# Patient Record
Sex: MALE | Race: WHITE | NOT HISPANIC OR LATINO | Employment: FULL TIME | URBAN - METROPOLITAN AREA
[De-identification: names, ages, dates, MRNs, and addresses within clinical notes are randomized per-mention and may not be internally consistent; named-entity substitution may affect disease eponyms.]

---

## 2017-03-07 ENCOUNTER — HOSPITAL ENCOUNTER (EMERGENCY)
Facility: HOSPITAL | Age: 27
Discharge: HOME | End: 2017-03-07
Admitting: EMERGENCY MEDICINE
Payer: COMMERCIAL

## 2017-03-07 VITALS
DIASTOLIC BLOOD PRESSURE: 78 MMHG | TEMPERATURE: 98.3 F | OXYGEN SATURATION: 96 % | SYSTOLIC BLOOD PRESSURE: 128 MMHG | RESPIRATION RATE: 18 BRPM | HEART RATE: 97 BPM

## 2017-03-07 PROCEDURE — 99282 EMERGENCY DEPT VISIT SF MDM: CPT

## 2017-08-05 ENCOUNTER — HOSPITAL ENCOUNTER (EMERGENCY)
Facility: HOSPITAL | Age: 27
Discharge: HOME | End: 2017-08-05
Attending: EMERGENCY MEDICINE | Admitting: EMERGENCY MEDICINE
Payer: COMMERCIAL

## 2017-08-05 VITALS
DIASTOLIC BLOOD PRESSURE: 84 MMHG | HEART RATE: 108 BPM | OXYGEN SATURATION: 97 % | TEMPERATURE: 98.3 F | SYSTOLIC BLOOD PRESSURE: 172 MMHG | RESPIRATION RATE: 20 BRPM

## 2017-08-05 DIAGNOSIS — F19.90 DRUG USE: Primary | ICD-10-CM

## 2017-08-05 PROCEDURE — 99283 EMERGENCY DEPT VISIT LOW MDM: CPT

## 2017-08-05 PROCEDURE — 93005 ELECTROCARDIOGRAM TRACING: CPT

## 2017-08-07 LAB
ATRIAL RATE: 96 BPM
P AXIS: 65 DEGREES
PR INTERVAL: 128 MS
QRS AXIS: 67 DEGREES
QRSD INTERVAL: 90 MS
QT INTERVAL: 328 MS
QTC INTERVAL: 406 MS
T WAVE AXIS: 29 DEGREES
VENTRICULAR RATE: 92 BPM

## 2017-08-31 ENCOUNTER — HOSPITAL ENCOUNTER (EMERGENCY)
Facility: HOSPITAL | Age: 27
Discharge: LEFT BEFORE DISCHARGE | End: 2017-08-31
Attending: EMERGENCY MEDICINE
Payer: COMMERCIAL

## 2017-08-31 ENCOUNTER — APPOINTMENT (EMERGENCY)
Dept: RADIOLOGY | Facility: HOSPITAL | Age: 27
End: 2017-08-31
Payer: COMMERCIAL

## 2017-08-31 VITALS
WEIGHT: 115.4 LBS | DIASTOLIC BLOOD PRESSURE: 78 MMHG | OXYGEN SATURATION: 98 % | HEART RATE: 92 BPM | RESPIRATION RATE: 18 BRPM | SYSTOLIC BLOOD PRESSURE: 123 MMHG | TEMPERATURE: 98 F

## 2017-08-31 DIAGNOSIS — R07.89 CHEST TIGHTNESS: Primary | ICD-10-CM

## 2017-08-31 PROCEDURE — 99284 EMERGENCY DEPT VISIT MOD MDM: CPT

## 2017-08-31 PROCEDURE — 93005 ELECTROCARDIOGRAM TRACING: CPT | Performed by: EMERGENCY MEDICINE

## 2017-08-31 RX ORDER — BUPRENORPHINE HYDROCHLORIDE AND NALOXONE HYDROCHLORIDE DIHYDRATE 8; 2 MG/1; MG/1
1 TABLET SUBLINGUAL DAILY
COMMUNITY

## 2017-09-13 ENCOUNTER — HOSPITAL ENCOUNTER (EMERGENCY)
Facility: HOSPITAL | Age: 27
Discharge: HOME | End: 2017-09-14
Attending: EMERGENCY MEDICINE
Payer: COMMERCIAL

## 2017-09-13 DIAGNOSIS — Z77.098 CHEMICAL EXPOSURE: Primary | ICD-10-CM

## 2017-09-14 ENCOUNTER — APPOINTMENT (EMERGENCY)
Dept: RADIOLOGY | Facility: HOSPITAL | Age: 27
End: 2017-09-14
Payer: COMMERCIAL

## 2017-09-14 VITALS
DIASTOLIC BLOOD PRESSURE: 65 MMHG | RESPIRATION RATE: 18 BRPM | OXYGEN SATURATION: 97 % | TEMPERATURE: 97.9 F | SYSTOLIC BLOOD PRESSURE: 110 MMHG | HEART RATE: 97 BPM | WEIGHT: 125 LBS

## 2017-09-14 LAB
ATRIAL RATE: 93 BPM
P AXIS: 49 DEGREES
PR INTERVAL: 124 MS
QRS AXIS: 59 DEGREES
QRSD INTERVAL: 86 MS
QT INTERVAL: 340 MS
QTC INTERVAL: 422 MS
T WAVE AXIS: 28 DEGREES
VENTRICULAR RATE: 93 BPM

## 2017-09-14 PROCEDURE — 99284 EMERGENCY DEPT VISIT MOD MDM: CPT

## 2017-09-14 PROCEDURE — 71020 HB CHEST X-RAY 2VW FRONTAL&LATL: CPT

## 2018-01-13 NOTE — MISCELLANEOUS
Provider Comments  Provider Comments: The patient did not show up for his appointment today  I was unable to leave a message for him to call back to reschedule  Signatures   Electronically signed by : CJ Aleman; Aug  3 2016  6:13AM EST                       (Author)    Electronically signed by : CJ Aleman;  Aug  3 2016  6:13AM EST                       (Author)    Electronically signed by : EDGAR Handy ; Aug  6 2016 10:01PM EST                       (Author)

## 2023-01-26 ENCOUNTER — OFFICE VISIT (OUTPATIENT)
Dept: INTERNAL MEDICINE CLINIC | Facility: CLINIC | Age: 33
End: 2023-01-26

## 2023-01-26 VITALS
HEART RATE: 76 BPM | DIASTOLIC BLOOD PRESSURE: 78 MMHG | TEMPERATURE: 98.4 F | WEIGHT: 149 LBS | HEIGHT: 68 IN | BODY MASS INDEX: 22.58 KG/M2 | OXYGEN SATURATION: 97 % | SYSTOLIC BLOOD PRESSURE: 112 MMHG

## 2023-01-26 DIAGNOSIS — L40.9 PSORIASIS: ICD-10-CM

## 2023-01-26 DIAGNOSIS — Z13.6 ENCOUNTER FOR SCREENING FOR CARDIOVASCULAR DISORDERS: ICD-10-CM

## 2023-01-26 DIAGNOSIS — R39.14 FEELING OF INCOMPLETE BLADDER EMPTYING: Primary | ICD-10-CM

## 2023-01-26 DIAGNOSIS — D17.22 LIPOMA OF LEFT UPPER EXTREMITY: ICD-10-CM

## 2023-01-26 LAB
SL AMB  POCT GLUCOSE, UA: NORMAL
SL AMB LEUKOCYTE ESTERASE,UA: NORMAL
SL AMB POCT BILIRUBIN,UA: NORMAL
SL AMB POCT BLOOD,UA: NORMAL
SL AMB POCT CLARITY,UA: CLEAR
SL AMB POCT COLOR,UA: YELLOW
SL AMB POCT KETONES,UA: NORMAL
SL AMB POCT NITRITE,UA: NORMAL
SL AMB POCT PH,UA: 7
SL AMB POCT SPECIFIC GRAVITY,UA: 1.02
SL AMB POCT URINE PROTEIN: NORMAL
SL AMB POCT UROBILINOGEN: 0.2

## 2023-01-26 RX ORDER — TAMSULOSIN HYDROCHLORIDE 0.4 MG/1
0.4 CAPSULE ORAL
Qty: 30 CAPSULE | Refills: 5 | Status: SHIPPED | OUTPATIENT
Start: 2023-01-26

## 2023-01-26 NOTE — PROGRESS NOTES
Name: Katy Torres      : 1990      MRN: [id number]  Encounter Provider: Jeane Johnson MD  Encounter Date: 2023   Encounter department: [address] [place] [address]  Feeling of incomplete bladder emptying  Assessment & Plan:   Urinalysis was unremarkable  Will initiate a trial of tamsulosin and re-evaluate    Orders:  -     tamsulosin (FLOMAX) 0 4 mg; Take 1 capsule (0 4 mg total) by mouth daily with dinner  -     CBC and differential; Future  -     Comprehensive metabolic panel; Future    2  Encounter for screening for cardiovascular disorders  Assessment & Plan:   Lipid profile and baseline blood work  Orders:  -     Lipid panel; Future    3  Psoriasis  -     CBC and differential; Future  -     Comprehensive metabolic panel; Future    4  Lipoma of left upper extremity  Assessment & Plan:   No specific treatment other than observation at this time  Patient will discuss with Dermatology when he is evaluated for his   Psoriasis    Orders:  -     CBC and differential; Future  -     Comprehensive metabolic panel; Future           Subjective       Patient presents to the office to establish primary care  He complains of an occasional feeling of fullness in his ears and is concerned about wax accumulations  He also notes a lipoma on his left shoulder  He has a history of psoriasis but does have a consultation scheduled with Dermatology  He is not currently taking any medications for his psoriasis  He also complains of a sensation of incomplete bladder emptying  He notes that at determination of his micturition he feels that he still has some urine that has not passed  He denies any dysuria or any type of discomfort  He denies any nocturia, hematuria, flank pain, saddle pain, discharge  He has no known cardiac history  Family history of hypertension and  Hodgkin's lymphoma in his maternal grandparents    He has a history of opioid addiction but has been clean for 4 years    Review of Systems   Constitutional: Negative  Negative for activity change, appetite change, chills, diaphoresis, fatigue, fever and unexpected weight change  HENT: Negative  Eyes: Negative  Respiratory: Negative  Cardiovascular: Negative  Gastrointestinal: Negative  Endocrine: Negative  Genitourinary: Negative  Musculoskeletal: Negative  Skin:         History of psoriasis and left shoulder lipoma   Neurological: Negative  Hematological: Negative  Psychiatric/Behavioral: The patient is not nervous/anxious          Past Medical History:   Diagnosis Date   • Addiction to drug ([address] )    • Hx of migraine headaches    • Psoriasis      Past Surgical History:   Procedure Laterality Date   • TYMPANOSTOMY TUBE PLACEMENT      age 10     Family History   Problem Relation Age of Onset   • No Known Problems Mother    • Hypertension Father    • Other Father         Colitiis   • Hodgkin's lymphoma Maternal Grandmother 72   • Lymphoma Maternal Grandfather 72        non-hodgkin's     Social History     Socioeconomic History   • Marital status: Single     Spouse name: None   • Number of children: None   • Years of education: None   • Highest education level: None   Occupational History   • None   Tobacco Use   • Smoking status: Former     Packs/day: 0 50     Types: Cigarettes     Start date:      Quit date: 2018     Years since quittin 0   • Smokeless tobacco: Never   Vaping Use   • Vaping Use: Never used   Substance and Sexual Activity   • Alcohol use: No   • Drug use: No   • Sexual activity: None   Other Topics Concern   • None   Social History Narrative   • None     Social Determinants of Health     Financial Resource Strain: Not on file   Food Insecurity: Not on file   Transportation Needs: Not on file   Physical Activity: Not on file   Stress: Not on file   Social Connections: Not on file   Intimate Partner Violence: Not on file   Housing Stability: Not on file     Current Outpatient Medications on File Prior to Visit   Medication Sig   • [DISCONTINUED] buprenorphine-naloxone (SUBOXONE) 8-2 mg per SL tablet Place 1 tablet under the tongue daily     Allergies   Allergen Reactions   • Augmentin [Amoxicillin-Pot Clavulanate] Hives   • Cefaclor Hives   • Cephalosporins Hives     Immunization History   Administered Date(s) Administered   • COVID-19 J&J (Favian) vaccine 0 5 mL 04/08/2021, 12/02/2021   • Tdap 04/13/2011       Objective     /78 (BP Location: Left arm, Patient Position: Sitting, Cuff Size: Standard)   Pulse 76   Temp 98 4 °F (36 9 °C) (Tympanic)   Ht 5' 7 72" (1 72 m)   Wt 67 6 kg (149 lb)   SpO2 97%   BMI 22 85 kg/m²     Physical Exam  Vitals reviewed  Constitutional:       General: He is not in acute distress  Appearance: Normal appearance  He is normal weight  He is not ill-appearing, toxic-appearing or diaphoretic  HENT:      Head: Normocephalic and atraumatic  Right Ear: Tympanic membrane, ear canal and external ear normal  There is no impacted cerumen  Left Ear: Tympanic membrane, ear canal and external ear normal  There is no impacted cerumen  Nose: Nose normal    Eyes:      General: No scleral icterus  Conjunctiva/sclera: Conjunctivae normal       Pupils: Pupils are equal, round, and reactive to light  Neck:      Vascular: No carotid bruit or JVD  Trachea: No tracheal deviation  Cardiovascular:      Rate and Rhythm: Normal rate and regular rhythm  Pulses: Normal pulses  Heart sounds: Normal heart sounds  No murmur heard  Pulmonary:      Effort: Pulmonary effort is normal  No respiratory distress  Breath sounds: Normal breath sounds  No rales  Abdominal:      General: Abdomen is flat  There is no distension  Musculoskeletal:         General: Deformity ( small lipoma approximately 3 cm diameter overlying the humeral head of the left shoulder) present  No swelling        Cervical back: Neck supple  Right lower leg: No edema  Left lower leg: No edema  Skin:     General: Skin is warm  Coloration: Skin is not jaundiced  Findings: Rash ( patchy areas of psoriasis  ) present  No bruising or erythema  Neurological:      General: No focal deficit present  Mental Status: He is alert and oriented to person, place, and time  Mental status is at baseline     Psychiatric:         Mood and Affect: Mood normal          Behavior: Behavior normal        Ruth Ann Patel MD

## 2023-01-26 NOTE — ASSESSMENT & PLAN NOTE
No specific treatment other than observation at this time    Patient will discuss with Dermatology when he is evaluated for his   Psoriasis

## 2023-03-27 PROBLEM — Z13.6 ENCOUNTER FOR SCREENING FOR CARDIOVASCULAR DISORDERS: Status: RESOLVED | Noted: 2023-01-26 | Resolved: 2023-03-27

## 2023-07-27 ENCOUNTER — APPOINTMENT (OUTPATIENT)
Dept: RADIOLOGY | Facility: OTHER | Age: 33
End: 2023-07-27
Payer: COMMERCIAL

## 2023-07-27 VITALS
SYSTOLIC BLOOD PRESSURE: 119 MMHG | WEIGHT: 149 LBS | DIASTOLIC BLOOD PRESSURE: 75 MMHG | HEIGHT: 67 IN | BODY MASS INDEX: 23.39 KG/M2 | HEART RATE: 75 BPM

## 2023-07-27 DIAGNOSIS — M25.522 PAIN IN LEFT ELBOW: Primary | ICD-10-CM

## 2023-07-27 DIAGNOSIS — M25.522 PAIN IN LEFT ELBOW: ICD-10-CM

## 2023-07-27 DIAGNOSIS — M79.2 RADICULAR PAIN IN LEFT ARM: ICD-10-CM

## 2023-07-27 DIAGNOSIS — G89.29 CHRONIC NECK PAIN: ICD-10-CM

## 2023-07-27 DIAGNOSIS — M54.2 CHRONIC NECK PAIN: ICD-10-CM

## 2023-07-27 PROCEDURE — 99203 OFFICE O/P NEW LOW 30 MIN: CPT | Performed by: PHYSICIAN ASSISTANT

## 2023-07-27 PROCEDURE — 73080 X-RAY EXAM OF ELBOW: CPT

## 2023-07-27 NOTE — PATIENT INSTRUCTIONS
Cervical Disc Herniation   WHAT YOU NEED TO KNOW:   Cervical disc herniation occurs when a cervical disc bulges out. Cervical discs are natural, spongy cushions between the vertebrae (bones) in your neck. The bulging disc may press on your nerves or spinal cord. DISCHARGE INSTRUCTIONS:   Return to the emergency department if:   You suddenly have trouble breathing. You lose feeling in one or both of your arms. You are suddenly not able to move your neck, or one or both of your arms. You are not able to move one or both of your legs. Contact your healthcare provider if:   Your pain gets worse, even after you take medicine. Your voice suddenly becomes hoarse. You have trouble swallowing. You have questions or concerns about your condition or care. Medicines: You may need any of the following:  NSAIDs , such as ibuprofen, help decrease swelling and pain. NSAIDs can cause stomach bleeding or kidney problems in certain people. If you take blood thinner medicine, always ask your healthcare provider if NSAIDs are safe for you. Always read the medicine label and follow directions. Prescription pain medicine  may be given. Ask how to take this medicine safely. Muscle relaxers  decrease pain and muscle spasms. Take your medicine as directed. Contact your healthcare provider if you think your medicine is not helping or if you have side effects. Tell your provider if you are allergic to any medicine. Keep a list of the medicines, vitamins, and herbs you take. Include the amounts, and when and why you take them. Bring the list or the pill bottles to follow-up visits. Carry your medicine list with you in case of an emergency. Activity:  Your healthcare provider may have you rest in bed to prevent further injury to your neck. Ask how long you should rest and when you can return to your daily activities.   Heat:  Apply heat on your neck for 20 to 30 minutes every 2 hours for as many days as directed. Heat helps decrease pain and muscle spasms. Ice:  Apply ice on your neck for 15 to 20 minutes every hour or as directed. Use an ice pack, or put crushed ice in a plastic bag. Cover it with a towel before you apply it to your skin. Ice helps prevent tissue damage and decreases swelling and pain. Physical therapy:  A physical therapist teaches you exercises to make your neck muscles stronger. A physical therapist also teaches you stretches to decrease your pain. Follow up with your healthcare provider as directed:  Write down your questions so you remember to ask them during your visits. © Copyright [employer] 2022 Information is for End User's use only and may not be sold, redistributed or otherwise used for commercial purposes. The above information is an  only. It is not intended as medical advice for individual conditions or treatments. Talk to your doctor, nurse or pharmacist before following any medical regimen to see if it is safe and effective for you.

## 2023-08-03 ENCOUNTER — EVALUATION (OUTPATIENT)
Dept: PHYSICAL THERAPY | Facility: REHABILITATION | Age: 33
End: 2023-08-03
Payer: COMMERCIAL

## 2023-08-03 DIAGNOSIS — M79.2 RADICULAR PAIN IN LEFT ARM: ICD-10-CM

## 2023-08-03 DIAGNOSIS — G89.29 CHRONIC NECK PAIN: Primary | ICD-10-CM

## 2023-08-03 DIAGNOSIS — M54.2 CHRONIC NECK PAIN: Primary | ICD-10-CM

## 2023-08-03 DIAGNOSIS — M25.522 PAIN IN LEFT ELBOW: ICD-10-CM

## 2023-08-03 PROCEDURE — 97140 MANUAL THERAPY 1/> REGIONS: CPT

## 2023-08-03 PROCEDURE — 97110 THERAPEUTIC EXERCISES: CPT

## 2023-08-03 PROCEDURE — 97161 PT EVAL LOW COMPLEX 20 MIN: CPT

## 2023-08-03 NOTE — PROGRESS NOTES
PT Evaluation     Today's date: 8/3/2023  Patient name: Bard Littlejohn  : 1990  MRN: [id number]  Referring provider: DANIELA Silva*  Dx:   Encounter Diagnosis     ICD-10-CM    1. Chronic neck pain  M54.2 Ambulatory Referral to Physical Therapy    G89.29       2. Pain in left elbow  M25.522 Ambulatory Referral to Physical Therapy      3. Radicular pain in left arm  M79.2 Ambulatory Referral to Physical Therapy          Start Time: 1630  Stop Time: 9339  Total time in clinic (min): 45 minutes    Assessment  Assessment details: Bard Littlejohn is a 28 y.o. male presenting with cervical and thoracic pain indicative of muscular tightness and motor control deficit. Pt has not had radicular symptoms in about 5 days. Likely not cervical radiculopathy at this time. Primary impairments include trapezius and levator scapulae pain with functional activities, middle and lower trapezius weakness, cervical AROM dysfunction, scapular and deep neck flexor motor control dysfunction. Educated pt on anatomy and physiology of diagnosis. Will benefit from skilled PT interventions for community reintegration, ADL management/independence, return to work/sport/hobbies. Provided pt with written home exercise program to be completed daily. Impairments: abnormal coordination, abnormal muscle firing, abnormal muscle tone, abnormal or restricted ROM, activity intolerance, impaired physical strength, lacks appropriate home exercise program, pain with function, poor posture  and poor body mechanics  Functional limitations: cervical flexion and R rotation  Symptom irritability: low  Goals    Short Term Goals: In 4 weeks, the patient will:  1. Improve L middle trapezius strength to at least 4+/5 MMT  2. Improve L lower trapezius strength to at least 4+/5 MMT  3. Supervision with [facility] for self-care    Long Term Goals: In 8 weeks, the patient will:  1.  Tolerate playing full round of golf without aggravating pain symptomology  2. FOTO to greater than predicted value  3.  Independent with HE[facility] for self-care    Plan  Patient would benefit from: skilled physical therapy  Planned modality interventions: manual electrical stimulation  Planned therapy interventions: abdominal trunk stabilization, activity modification, balance, balance/weight bearing training, behavior modification, body mechanics training, community reintegration, coordination, fine motor coordination training, flexibility, functional ROM exercises, gait training, graded activity, graded exercise, graded motor, home exercise program, work reintegration, therapeutic training, therapeutic exercise, therapeutic activities, stretching, strengthening, self care, postural training, patient education, neuromuscular re-education, motor coordination training, massage, manual therapy, joint mobilization and ADL training  Frequency: 1x week  Duration in weeks: 8  Plan of Care beginning date: 8/3/2023  Plan of Care expiration date: 9/28/2023  Treatment plan discussed with: patient        Subjective    Pain Location: L sided cervical and upper thoracic pain radiating into LUE to medial elbow  Pain Intensity: "push" "pull", occasional sharp; worst 8/10  MARGARET: tightness after sleeping on new pillow then played a round of golf, has been improving slightly recently  DOI: 2 months  Aggravating Factors: cervical flexion+R rotation  Alleviating Factors: Aleve 2x per day  Living Situation: independent ADLs  Constitutional S/S: initial paresthesias but has not had these symptoms for 4-5 days   Dominant Hand: R for writing; L for everything else  Goals: "to minimize pain"  PLOF: has not swung golf club since injury      Objective       Postural Findings:              Head Position x Protracted  Neutral  Retracted   Scapular Position x Protracted  Neutral  Retracted   Thoracic Spine  Inc Kyphosis x Neutral     Lumbar Spine  Inc Lordosis x Neutral  Dec Lordosis   Pelvis  Anterior Tilt x Neutral  Posterior Tilt   Iliac Crest  L elevated x Neutral  R elevated   Scoliotic Curvature  "C" Curve  "S" Curve     Lateral Shift  Right  Left x None       Strength and ROM evaluated B from a regional biomechanical perspective and values relevant to this episode recorded in tables below    Range of Motion measurements for the Cervical Spine (in degrees)     Joint Motion Right:  Left:    Flexion 75%*    Extension 75%    Rotation 50%* 75%   Lateral Flexion 50% 75%   Protraction 75%    Retraction 15%    * indicates increase in pain    UE Myotomes:  Nerve Root Test Action RIGHT  LEFT    C3 Neck side flexion intact intact   C4 Shoulder elevation intact intact   C5 Shoulder abduction intact intact   C6 Elbow Flexion and wrist extension intact intact   C7 Elbow extension and wrist flexion intact intact   C8 Thumb extension and ulnar deviation intact intact   T1 Hand intrinsics intact intact       Manual Muscle Testing:     Strength: MMT revealed the following findings.   Joint Motion Right Left   Upper trap 5/5 5/5   Mid trap 5/5 4-/5*   Low trap 5/5 4-/5*   Rhomboids 5/5 5/5   Latissimus dorsi 5/5               5/5   * indicates increase in pain        Palpation:  TTP throughout L trapezius (upper, middle), levator scap, subocipitals    Joint Mobility: WFL    Cervical Vascular Screenin-D's   Dizziness   Diploplia   Drop Attacks   Dysphagia   Dysarthria  3-N's   Nausea   Nystagmus    Numbness  The above were all  Negative    Upper Cervical Ligament Testing:   Transverse ligament stress test   Alar Ligament stress test   Sharp-Eliana   *The above were all  Negative      OUTCOME MEAUSURES:     FOTO: 79                 Precautions: lipoma removal     Asterisk signs and Outcome Measures:                                                                                                                                                                                 Test / Measure  8/3/2023      FOTO 79      C/s retraction AROM 15%      L mid, low trap MMT 4-/5          Manuals 8/3            C/s STM, SOR, UT S, PROM MM 8'                                                   Neuro Re-Ed             DNF supine 5x            Neck bridge at wall             Prone I, T, Y                                                                 Ther Ex             HEP review 5'            UBE             Rows 10x mtb            Sh ext 10x mtb            Facepulls             UT/LS S 30" ea            Shrugs                          Ther Activity                                       Gait Training                                       Modalities

## 2023-08-08 ENCOUNTER — OFFICE VISIT (OUTPATIENT)
Dept: PHYSICAL THERAPY | Facility: REHABILITATION | Age: 33
End: 2023-08-08
Payer: COMMERCIAL

## 2023-08-08 DIAGNOSIS — M79.2 RADICULAR PAIN IN LEFT ARM: ICD-10-CM

## 2023-08-08 DIAGNOSIS — M54.2 CHRONIC NECK PAIN: ICD-10-CM

## 2023-08-08 DIAGNOSIS — G89.29 CHRONIC NECK PAIN: ICD-10-CM

## 2023-08-08 DIAGNOSIS — M25.522 PAIN IN LEFT ELBOW: Primary | ICD-10-CM

## 2023-08-08 PROCEDURE — 97140 MANUAL THERAPY 1/> REGIONS: CPT

## 2023-08-08 PROCEDURE — 97110 THERAPEUTIC EXERCISES: CPT

## 2023-08-08 PROCEDURE — 97112 NEUROMUSCULAR REEDUCATION: CPT

## 2023-08-08 NOTE — PROGRESS NOTES
Daily Note     Today's date: 2023  Patient name: Sulema Valenzuela  : 1990  MRN: [id number]  Referring provider: DANIELA Caputo*  Dx:   Encounter Diagnosis     ICD-10-CM    1. Pain in left elbow  M25.522       2. Chronic neck pain  M54.2     G89.29       3. Radicular pain in left arm  M79.2           Start Time: 81  Stop Time: 6309  Total time in clinic (min): 40 minutes    Subjective: Pt notes that his neck pain has improved and he was able to hit a few golf balls without pain today. Objective: See treatment diary below      Assessment: Tolerated treatment well. Patient would benefit from continued PT. Pt was introduced to further functional UE strength and cervico-thoracic postural endurance exercises and responded well without increase in pain. He showed increased soreness with palpation along thoracic spine paraspinals and responded well to MT focused on paraspinal STM and thoracic extension. He was most challenged with shrugs + retraction and standing rotation as paraspinal strength and thoracic mobility continues to be decreased. He responds well to VCs for elbow positioning and scapular stabilization throughout UE movements and shows improved technique with cueing. 1:1 with Shakira Dykes DPT entirety of tx. Plan: Continue per plan of care.       Precautions: lipoma removal     Asterisk signs and Outcome Measures:                                                                                                                                                                                 Test / Measure  8/3/2023      FOTO 79      C/s retraction AROM 15%      L mid, low trap MMT 4-/5          Manuals 8/3 8/8           C/s STM, SOR, UT S, PROM MM 8' [facility] 8'                                                                      Neuro Re-Ed             DNF supine 5x 10x           Neck bridge at wall             Prone I, T, Y  I/T 10x 3s hold           Supine Thoracic Extension  2x10, Blue foam                                                  Ther Ex             HEP review 5'            UBE  3'/3', lvl 7           SL Open Book BL  8x ea, 5s hold           Rows 10x mtb 3x8, 35# susy           Sh ext 10x mtb 3x8, 20# susy           Facepulls  3x8, 25#           UT/LS S 30" ea            Shrugs  +retraction, 3x10, 15#           Long Bar Thoracic Rotation  13#, 2x10 BL                                     Ther Activity                                       Gait Training                                       Modalities

## 2023-08-15 ENCOUNTER — APPOINTMENT (OUTPATIENT)
Dept: PHYSICAL THERAPY | Facility: REHABILITATION | Age: 33
End: 2023-08-15
Payer: COMMERCIAL

## 2023-08-17 ENCOUNTER — APPOINTMENT (OUTPATIENT)
Dept: PHYSICAL THERAPY | Facility: REHABILITATION | Age: 33
End: 2023-08-17
Payer: COMMERCIAL

## 2023-08-22 ENCOUNTER — APPOINTMENT (OUTPATIENT)
Dept: PHYSICAL THERAPY | Facility: REHABILITATION | Age: 33
End: 2023-08-22
Payer: COMMERCIAL

## 2023-08-29 ENCOUNTER — APPOINTMENT (OUTPATIENT)
Dept: PHYSICAL THERAPY | Facility: REHABILITATION | Age: 33
End: 2023-08-29
Payer: COMMERCIAL

## 2024-01-01 ENCOUNTER — APPOINTMENT (EMERGENCY)
Dept: CT IMAGING | Facility: HOSPITAL | Age: 34
End: 2024-01-01
Payer: COMMERCIAL

## 2024-01-01 ENCOUNTER — HOSPITAL ENCOUNTER (EMERGENCY)
Facility: HOSPITAL | Age: 34
Discharge: HOME | End: 2024-01-01
Attending: EMERGENCY MEDICINE
Payer: COMMERCIAL

## 2024-01-01 VITALS
OXYGEN SATURATION: 98 % | WEIGHT: 156.53 LBS | HEART RATE: 53 BPM | SYSTOLIC BLOOD PRESSURE: 117 MMHG | DIASTOLIC BLOOD PRESSURE: 73 MMHG | TEMPERATURE: 97.7 F | BODY MASS INDEX: 24.52 KG/M2 | RESPIRATION RATE: 17 BRPM

## 2024-01-01 DIAGNOSIS — K92.1 HEMATOCHEZIA: ICD-10-CM

## 2024-01-01 DIAGNOSIS — N50.819 TESTICULAR PAIN: Primary | ICD-10-CM

## 2024-01-01 DIAGNOSIS — R39.198 DIFFICULTY URINATING: ICD-10-CM

## 2024-01-01 LAB
ALBUMIN SERPL BCP-MCNC: 4.5 G/DL (ref 3.5–5)
ALP SERPL-CCNC: 65 U/L (ref 34–104)
ALT SERPL W P-5'-P-CCNC: 12 U/L (ref 7–52)
ANION GAP SERPL CALCULATED.3IONS-SCNC: 8 MMOL/L
AST SERPL W P-5'-P-CCNC: 16 U/L (ref 13–39)
BASOPHILS # BLD AUTO: 0.04 THOUSANDS/ÂΜL (ref 0–0.1)
BASOPHILS NFR BLD AUTO: 1 % (ref 0–1)
BILIRUB SERPL-MCNC: 0.36 MG/DL (ref 0.2–1)
BILIRUB UR QL STRIP: NEGATIVE
BUN SERPL-MCNC: 18 MG/DL (ref 5–25)
CALCIUM SERPL-MCNC: 9.2 MG/DL (ref 8.4–10.2)
CHLORIDE SERPL-SCNC: 104 MMOL/L (ref 96–108)
CK SERPL-CCNC: 69 U/L (ref 39–308)
CLARITY UR: CLEAR
CO2 SERPL-SCNC: 26 MMOL/L (ref 21–32)
COLOR UR: COLORLESS
CREAT SERPL-MCNC: 1.64 MG/DL (ref 0.6–1.3)
EOSINOPHIL # BLD AUTO: 0.08 THOUSAND/ÂΜL (ref 0–0.61)
EOSINOPHIL NFR BLD AUTO: 1 % (ref 0–6)
ERYTHROCYTE [DISTWIDTH] IN BLOOD BY AUTOMATED COUNT: 11.6 % (ref 11.6–15.1)
GFR SERPL CREATININE-BSD FRML MDRD: 53 ML/MIN/1.73SQ M
GLUCOSE SERPL-MCNC: 95 MG/DL (ref 65–140)
GLUCOSE UR STRIP-MCNC: NEGATIVE MG/DL
HCT VFR BLD AUTO: 46.3 % (ref 36.5–49.3)
HGB BLD-MCNC: 15.6 G/DL (ref 12–17)
HGB UR QL STRIP.AUTO: NEGATIVE
IMM GRANULOCYTES # BLD AUTO: 0.02 THOUSAND/UL (ref 0–0.2)
IMM GRANULOCYTES NFR BLD AUTO: 0 % (ref 0–2)
KETONES UR STRIP-MCNC: NEGATIVE MG/DL
LEUKOCYTE ESTERASE UR QL STRIP: NEGATIVE
LYMPHOCYTES # BLD AUTO: 1.96 THOUSANDS/ÂΜL (ref 0.6–4.47)
LYMPHOCYTES NFR BLD AUTO: 32 % (ref 14–44)
MCH RBC QN AUTO: 32.4 PG (ref 26.8–34.3)
MCHC RBC AUTO-ENTMCNC: 33.7 G/DL (ref 31.4–37.4)
MCV RBC AUTO: 96 FL (ref 82–98)
MONOCYTES # BLD AUTO: 0.52 THOUSAND/ÂΜL (ref 0.17–1.22)
MONOCYTES NFR BLD AUTO: 9 % (ref 4–12)
NEUTROPHILS # BLD AUTO: 3.53 THOUSANDS/ÂΜL (ref 1.85–7.62)
NEUTS SEG NFR BLD AUTO: 57 % (ref 43–75)
NITRITE UR QL STRIP: NEGATIVE
NRBC BLD AUTO-RTO: 0 /100 WBCS
PH UR STRIP.AUTO: 6 [PH]
PLATELET # BLD AUTO: 194 THOUSANDS/UL (ref 149–390)
PMV BLD AUTO: 9.5 FL (ref 8.9–12.7)
POTASSIUM SERPL-SCNC: 4 MMOL/L (ref 3.5–5.3)
PROT SERPL-MCNC: 7 G/DL (ref 6.4–8.4)
PROT UR STRIP-MCNC: NEGATIVE MG/DL
RBC # BLD AUTO: 4.82 MILLION/UL (ref 3.88–5.62)
SODIUM SERPL-SCNC: 138 MMOL/L (ref 135–147)
SP GR UR STRIP.AUTO: 1.01 (ref 1–1.03)
UROBILINOGEN UR STRIP-ACNC: <2 MG/DL
WBC # BLD AUTO: 6.15 THOUSAND/UL (ref 4.31–10.16)

## 2024-01-01 PROCEDURE — 85025 COMPLETE CBC W/AUTO DIFF WBC: CPT

## 2024-01-01 PROCEDURE — 80053 COMPREHEN METABOLIC PANEL: CPT

## 2024-01-01 PROCEDURE — 82550 ASSAY OF CK (CPK): CPT | Performed by: EMERGENCY MEDICINE

## 2024-01-01 PROCEDURE — 96361 HYDRATE IV INFUSION ADD-ON: CPT

## 2024-01-01 PROCEDURE — 99283 EMERGENCY DEPT VISIT LOW MDM: CPT

## 2024-01-01 PROCEDURE — G1004 CDSM NDSC: HCPCS

## 2024-01-01 PROCEDURE — 74177 CT ABD & PELVIS W/CONTRAST: CPT

## 2024-01-01 PROCEDURE — 36415 COLL VENOUS BLD VENIPUNCTURE: CPT

## 2024-01-01 PROCEDURE — 87491 CHLMYD TRACH DNA AMP PROBE: CPT | Performed by: EMERGENCY MEDICINE

## 2024-01-01 PROCEDURE — 99285 EMERGENCY DEPT VISIT HI MDM: CPT | Performed by: EMERGENCY MEDICINE

## 2024-01-01 PROCEDURE — 81003 URINALYSIS AUTO W/O SCOPE: CPT | Performed by: EMERGENCY MEDICINE

## 2024-01-01 PROCEDURE — 87591 N.GONORRHOEAE DNA AMP PROB: CPT | Performed by: EMERGENCY MEDICINE

## 2024-01-01 PROCEDURE — 96360 HYDRATION IV INFUSION INIT: CPT

## 2024-01-01 RX ORDER — DOCUSATE SODIUM 100 MG/1
100 CAPSULE, LIQUID FILLED ORAL EVERY 12 HOURS
Qty: 60 CAPSULE | Refills: 0 | Status: SHIPPED | OUTPATIENT
Start: 2024-01-01

## 2024-01-01 RX ADMIN — IOHEXOL 85 ML: 350 INJECTION, SOLUTION INTRAVENOUS at 21:01

## 2024-01-01 RX ADMIN — SODIUM CHLORIDE 1000 ML: 0.9 INJECTION, SOLUTION INTRAVENOUS at 21:38

## 2024-01-01 NOTE — Clinical Note
Mitesh Ulloa was seen and treated in our emergency department on 1/1/2024.                Diagnosis: Lower Abdominal Pain    Mitesh  .    He may return on this date: 01/03/2024         If you have any questions or concerns, please don't hesitate to call.      Casper Hammer, DO    ______________________________           _______________          _______________  Hospital Representative                              Date                                Time

## 2024-01-02 LAB
C TRACH DNA SPEC QL NAA+PROBE: NEGATIVE
N GONORRHOEA DNA SPEC QL NAA+PROBE: NEGATIVE

## 2024-01-02 NOTE — ED PROVIDER NOTES
History  Chief Complaint   Patient presents with    Groin Pain     Patient here for eval of groin pain that worsened last night. +blood in stools. States he's been going through this for the past 2 years. Last BM today. +Nausea.      23-year-old male with history of substance abuse disorder, chronic difficulty in emptying his bladder, presents today with left testicular pain and blood in stool.  Patient noted this morning his left testicle was painful and tender to palpation, never had a bowel movement and noticed darker red blood in the toilet.  Patient notes continued discomfort in the testicle and rectum, but pain has subsided.  Denies any change in his urinary frequency, odor, diarrhea/constipation, fever/chills, or any other symptoms at this time.        Prior to Admission Medications   Prescriptions Last Dose Informant Patient Reported? Taking?   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    E-Cigarette/Vaping    E-Cigarette Use Never User      E-Cigarette/Vaping Substances     Social History     Tobacco Use    Smoking status: Former     Current packs/day: 0.00     Average packs/day: 0.5 packs/day for 11.0 years (5.5 ttl pk-yrs)     Types: Cigarettes     Start date:      Quit date: 2018     Years since quittin.0    Smokeless tobacco: Never   Vaping  Use    Vaping status: Never Used   Substance Use Topics    Alcohol use: No    Drug use: Not Currently        Review of Systems   Constitutional:  Negative for chills and fever.   HENT:  Negative for hearing loss and rhinorrhea.    Eyes:  Negative for visual disturbance.   Respiratory:  Negative for cough and shortness of breath.    Cardiovascular:  Negative for chest pain.   Gastrointestinal:  Positive for abdominal pain (suprapubic) and blood in stool. Negative for constipation, diarrhea, nausea and vomiting.   Genitourinary:  Positive for difficulty urinating, frequency and testicular pain. Negative for dysuria, hematuria and scrotal swelling.   Musculoskeletal:  Negative for back pain.   Skin:  Negative for color change and rash.   Neurological:  Negative for weakness and numbness.   Psychiatric/Behavioral:  Negative for agitation.    All other systems reviewed and are negative.      Physical Exam  ED Triage Vitals   Temperature Pulse Respirations Blood Pressure SpO2   01/01/24 1727 01/01/24 1727 01/01/24 1727 01/01/24 1727 01/01/24 1727   97.7 °F (36.5 °C) 61 18 137/67 99 %      Temp Source Heart Rate Source Patient Position - Orthostatic VS BP Location FiO2 (%)   01/01/24 1727 01/01/24 1727 01/01/24 1727 01/01/24 1727 --   Oral Monitor Sitting Right arm       Pain Score       01/01/24 2005       7             Orthostatic Vital Signs  Vitals:    01/01/24 1727 01/01/24 2005 01/01/24 2217   BP: 137/67 116/70 117/73   Pulse: 61 (!) 54 (!) 53   Patient Position - Orthostatic VS: Sitting Sitting Lying       Physical Exam  Vitals and nursing note reviewed.   Constitutional:       General: He is not in acute distress.     Appearance: He is not ill-appearing.   HENT:      Head: Normocephalic and atraumatic.      Right Ear: External ear normal.      Left Ear: External ear normal.      Nose: Nose normal. No congestion or rhinorrhea.      Mouth/Throat:      Mouth: Mucous membranes are moist.      Pharynx: Oropharynx is  clear.   Eyes:      General: No scleral icterus.     Extraocular Movements: Extraocular movements intact.   Cardiovascular:      Rate and Rhythm: Normal rate and regular rhythm.      Pulses: Normal pulses.      Heart sounds: Normal heart sounds.   Pulmonary:      Effort: Pulmonary effort is normal.      Breath sounds: Normal breath sounds.   Abdominal:      Palpations: Abdomen is soft.      Tenderness: There is abdominal tenderness (Suprapubic).   Genitourinary:     Penis: Normal.       Testes: Normal.      Comments: No abnormal scrotal skin changes, testicles nontender to palpation or manipulation, cremasteric reflex intact.  Musculoskeletal:         General: Normal range of motion.      Cervical back: Normal range of motion.   Skin:     General: Skin is warm and dry.   Neurological:      General: No focal deficit present.      Mental Status: He is alert and oriented to person, place, and time.   Psychiatric:         Mood and Affect: Mood normal.         Behavior: Behavior normal.         ED Medications  Medications   iohexol (OMNIPAQUE) 350 MG/ML injection (MULTI-DOSE) 85 mL (85 mL Intravenous Given 1/1/24 2101)   sodium chloride 0.9 % bolus 1,000 mL (1,000 mL Intravenous New Bag 1/1/24 2138)       Diagnostic Studies  Results Reviewed       Procedure Component Value Units Date/Time    UA (URINE) with reflex to Scope [[id number]] Collected: 01/01/24 2139    Lab Status: Final result Specimen: Urine, Clean Catch Updated: 01/01/24 2153     Color, UA Colorless     Clarity, UA Clear     Specific Gravity, UA 1.007     pH, UA 6.0     Leukocytes, UA Negative     Nitrite, UA Negative     Protein, UA Negative mg/dl      Glucose, UA Negative mg/dl      Ketones, UA Negative mg/dl      Urobilinogen, UA <2.0 mg/dl      Bilirubin, UA Negative     Occult Blood, UA Negative    Chlamydia/GC amplified DNA by PCR [[id number]] Collected: 01/01/24 2139    Lab Status: In process Specimen: Urine, Other Updated: 01/01/24 2143    CK [[id number]]   (Normal) Collected: 01/01/24 1910    Lab Status: Final result Specimen: Blood from Arm, Left Updated: 01/01/24 2139     Total CK 69 U/L     Comprehensive metabolic panel [[id number]]  (Abnormal) Collected: 01/01/24 1910    Lab Status: Final result Specimen: Blood from Arm, Left Updated: 01/01/24 2041     Sodium 138 mmol/L      Potassium 4.0 mmol/L      Chloride 104 mmol/L      CO2 26 mmol/L      ANION GAP 8 mmol/L      BUN 18 mg/dL      Creatinine 1.64 mg/dL      Glucose 95 mg/dL      Calcium 9.2 mg/dL      AST 16 U/L      ALT 12 U/L      Alkaline Phosphatase 65 U/L      Total Protein 7.0 g/dL      Albumin 4.5 g/dL      Total Bilirubin 0.36 mg/dL      eGFR 53 ml/min/1.73sq m     Narrative:      National Kid[employer] Foundation guidelines for Chronic Kidney Disease (CKD):     Stage 1 with normal or high GFR (GFR > 90 mL/min/1.73 square meters)    Stage 2 Mild CKD (GFR = 60-89 mL/min/1.73 square meters)    Stage 3A Moderate CKD (GFR = 45-59 mL/min/1.73 square meters)    Stage 3B Moderate CKD (GFR = 30-44 mL/min/1.73 square meters)    Stage 4 Severe CKD (GFR = 15-29 mL/min/1.73 square meters)    Stage 5 End Stage CKD (GFR <15 mL/min/1.73 square meters)  Note: GFR calculation is accurate only with a steady state creatinine    CBC and differential [[id number]] Collected: 01/01/24 1910    Lab Status: Final result Specimen: Blood from Arm, Left Updated: 01/01/24 1919     WBC 6.15 Thousand/uL      RBC 4.82 Million/uL      Hemoglobin 15.6 g/dL      Hematocrit 46.3 %      MCV 96 fL      MCH 32.4 pg      MCHC 33.7 g/dL      RDW 11.6 %      MPV 9.5 fL      Platelets 194 Thousands/uL      nRBC 0 /100 WBCs      Neutrophils Relative 57 %      Immat GRANS % 0 %      Lymphocytes Relative 32 %      Monocytes Relative 9 %      Eosinophils Relative 1 %      Basophils Relative 1 %      Neutrophils Absolute 3.53 Thousands/µL      Immature Grans Absolute 0.02 Thousand/uL      Lymphocytes Absolute 1.96 Thousands/µL      Monocytes Absolute  0.52 Thousand/µL      Eosinophils Absolute 0.08 Thousand/µL      Basophils Absolute 0.04 Thousands/µL                    CT abdomen pelvis with contrast   Final Result by David Jimenez MD (01/01 2246)      No acute intra-abdominal abnormality. No free air or free fluid.            Workstation performed: [id number]               Procedures  Procedures      ED Course  ED Course as of 01/01/24 2305   Mon Jan 01, 2024 2022 CBC and differential  wnl                                       Medical Decision Making  Mitesh came to the ER due to continued difficulty urinating, and acute onsets of testicular pain and hematochezia.  Patient noted to testicular pain this morning and then had a large bowel movement with red blood in the toilet.  He has never had any symptoms before.  Exam was largely unremarkable with no signs of external scrotal changes, and testicles not being tender to palpation/manipulation.  Patient has been seen for multiple years and was attempting it worked up for his urinary difficulties while he was in [facility].  Lab work was largely unremarkable aside from an elevated creatinine.  We did not have any labs to compare to and patient was unable to recall if this is chronic or acute.  Patient given a liter of normal saline.  CT of abdomen pelvis showed no acute intra-abdominal abnormalities.  Patient was comfortable throughout his stay in the ED.  Due to urinary difficulty and testicular pain, patient was given ambulatory referral for urology.  Due to hematochezia patient was given amatory referral to GI.  He was also instructed to follow with his PCP.  Patient endorsed an appointment already set for approximately 2 to 3 weeks from now with outpatient labs ordered.  He was instructed to follow through with those appointments and lab draws.  Patient understood and agreed the plan.  Patient discharged stable condition.    Amount and/or Complexity of Data Reviewed  Labs: ordered. Decision-making details  documented in ED Course.  Radiology: ordered.    Risk  OTC drugs.  Prescription drug management.          Disposition  Final diagnoses:   Testicular pain   Hematochezia   Difficulty urinating     Time reflects when diagnosis was documented in both MDM as applicable and the Disposition within this note       Time User Action Codes Description Comment    1/1/2024 10:50 PM Harman Casper TYSON Add [N50.819] Testicular pain     1/1/2024 10:50 PM Casper Hammer Add [K92.1] Hematochezia     1/1/2024 10:51 PM Casper Hammer Add [R39.198] Difficulty urinating           ED Disposition       ED Disposition   Discharge    Condition   Stable    Date/Time   Mon Jan 1, 2024 10:50 PM    Comment   Mitesh MESA Ulloa discharge to home/self care.                   Follow-up Information       Follow up With Specialties Details Why Contact Info Additional Information    [facility] [place] [facility] Call  As needed [address]  [facility] [address]  [phone] [facility] [address] [address], [address], [place], [address]   [phone]    [facility] Call  As needed [address]  [address]  [facility] [address]  [phone] [facility], [address], [place] [address]   [phone]    [facility] [place] [facility] Call  As needed [address]  [facility] [address]  [phone] [facility] [place], [address], [place], [place], [address]   [phone]            Patient's Medications   Discharge Prescriptions    DOCUSATE SODIUM (COLACE) 100 MG CAPSULE    Take 1 capsule (100 mg total) by mouth every 12 (twelve) hours       Start Date: 1/1/2024  End Date: --       Order Dose: 100 mg       Quantity: 60 capsule    Refills: 0         PDMP Review       None              ED Provider  Attending physically available and evaluated Mitesh Ulloa. I managed the patient along with the ED Attending.    Electronically Signed by           Casper Hammer DO  01/01/24 2506

## 2024-01-03 NOTE — ED ATTENDING ATTESTATION
1/1/2024  I, Michelle Bosch MD, saw and evaluated the patient. I have discussed the patient with the resident/non-physician practitioner and agree with the resident's/non-physician practitioner's findings, Plan of Care, and MDM as documented in the resident's/non-physician practitioner's note, except where noted. All available labs and Radiology studies were reviewed.  I was present for key portions of any procedure(s) performed by the resident/non-physician practitioner and I was immediately available to provide assistance.       At this point I agree with the current assessment done in the Emergency Department.  I have conducted an independent evaluation of this patient a history and physical is as follows:    Patient is a 33-year-old male who presents to the emergency department explaining that he has been having an ongoing problem over the last couple of years.  He appreciates urinary urgency, frequency and at times sensation of incomplete void.  At times he urinates more frequently than hourly.  He has never appreciated hematuria and does not appreciate dysuria.  He does occasionally note small amount of urine dripping after completion of stream though never any other discharge.  He has not appreciated any penile lesions.  Over the last couple of days suprapubic discomfort along with the symptoms has been more intense.  This is a pressure sensation as well as mild left inguinal/testicular discomfort.  He has not appreciated swelling or discoloration.  Today he became alarmed upon seeing some liquid blood along with passage of firm stool.  He is uncertain whether this was mixed in the stool or only surrounding it.  He has appreciated small amounts of blood on wiping intermittently in the past.  He does admit to having been under more stress than usual over the last month with increased firmness of stools accompanying this.  No weight change, fevers, diarrhea or changes in appetite.  He remains  able to perform his usual activities including job which entails heavy lifting.    Earlier this year he was initiated on Flomax by physician he saw on 1 occasion.  He was concerned that this caused side effects and discontinued it.  He has not appreciated improvement in urination while on it.  He is uncertain what renal function has been on prior testing.  He did very recently establish with a new PCP and discuss his urinary concerns with him.  He has pursued an outpatient ultrasound (results unremarkable.  Postvoid residual normal) and will complete ordered blood work shortly.  Patient notes that for 4-year span between 2018 and 2022 he was incarcerated.  He did have some medical care including blood work though is not aware of study results.  He denies that anyone had mentioned concern for abnormal renal function.  He has been told that he was dehydrated on a couple of urgent care visits.  No BMP or CMP is available for my review and [facility]'s records or care everywhere.  Family history significant for father with ulcerative colitis.    On exam patient is alert.  Mucous membranes moist.  No pallor nor icterus.  Heart sounds regular and lungs clear to auscultation bilaterally.  No CVA tenderness is present.  Abdomen is thin and soft with normoactive bowel sounds.  He is moderately tender in the suprapubic region although without guarding.  Very mild bilateral inguinal tenderness without palpable lymphadenopathy.  Circumcised.  No external genital lesions appreciated.  Very minimal left testicular tenderness.  No right testicular tenderness.  Both similar in size without appreciable scrotal swelling.    Differential diagnosis includes but is not limited to separate versus unifying diagnoses for his recent symptoms: Constipation, internal hemorrhoids with bleeding, colitis (infectious or inflammatory, diverticulosis (with bleed), diverticulitis, colonic or alternate intra-abdominal/pelvic mass, UTI, ureterolithiasis  and/or bladder stone, BPH, prostatitis, STI, intermittent urinary retention secondary to viral infection, neurological abnormality.    Labs including UA (unremarkable) and blood work (no leukocytosis, normal hemoglobin, elevated creatinine indicating renal insufficiency and absence of electrolyte abnormality) pursued.  No prior creatinine available for comparison.  Symptoms have only mildly changed recently.  Suspect that this is subacute or chronic.  No concerning findings on CT scan.    Patient aware to increase hydration.  Some IV fluids were administered here and he will continue with p.o. hydration at home.  He is aware to pursue outpatient labs as previously prescribed by PCP.  Creatinine anticipated to be 1 of these labs.  He will keep his appointment and follow-up with PCP as well in a few weeks.  Consider bowel regimen given firmer than typical stools as well as GI follow-up given blood present on numerous occasions-especially in setting of family history with father having IBD.  With worsening urinary symptoms and absence of clear explanation advise urology follow-up.  Patient demonstrates understanding.          ED Course         Critical Care Time  Procedures

## 2024-04-24 ENCOUNTER — OFFICE VISIT (OUTPATIENT)
Dept: UROLOGY | Facility: CLINIC | Age: 34
End: 2024-04-24
Payer: COMMERCIAL

## 2024-04-24 VITALS
HEIGHT: 67 IN | BODY MASS INDEX: 25.99 KG/M2 | WEIGHT: 165.6 LBS | OXYGEN SATURATION: 99 % | DIASTOLIC BLOOD PRESSURE: 68 MMHG | HEART RATE: 67 BPM | SYSTOLIC BLOOD PRESSURE: 116 MMHG

## 2024-04-24 DIAGNOSIS — R39.198 DIFFICULTY URINATING: ICD-10-CM

## 2024-04-24 DIAGNOSIS — N50.819 TESTICULAR PAIN: ICD-10-CM

## 2024-04-24 PROCEDURE — 99203 OFFICE O/P NEW LOW 30 MIN: CPT | Performed by: UROLOGY

## 2024-04-24 NOTE — PATIENT INSTRUCTIONS
Program for Pelvic Floor Relaxation   Jeovanny Mckinney MD,PhD        The pelvic floor is a group of muscles that sits at the very bottom of your urinary and digestive tracts.  Although these muscles must stay toned to prevent urinary incontinence (or leakage), they must also relax in order for us to urinate comfortably.   If you are receiving this handout, your urology provider believes that your pelvic floor could use some help.           This part of your body is a common source of urinary difficulty including:     Weak urinary stream, needing to push or strain to empty your bladder     Urinary frequency or urgency     Pain or discomfort during or after urination     You have probably been holding these muscles “tight” for a long time.  When this happens, your brain accepts this as normal.  The first step is to become aware of the tense muscles.  Then you can begin your practice to relax these muscles.       Although the other organs in your urinary tract can be addressed with medications or surgery (such as the bladder, or prostate in men), pelvic floor relaxation requires a different plan.  This is all about the mind-body connection!  In addition, the pelvic floor can become excessively tight with stress.       Relaxing your pelvic floor involves 3 steps: (1) training your body to identify these muscles, (2) relaxation exercises, and (3) mindfulness practices for stress reduction.     Practice for 10-30 minutes daily (or twice daily if needed).  The more you practice, the better you will feel.  As you become more aware of the muscles and how tense they are, relaxation will start to become automatic.  Be patient and keep trying until you are able to relax easily and quickly.     Please visit my website for a collection of carefully selected videos and other resources to guide your pelvic floor relaxation practice!       [email or web address]      You can  also access this website using this QR code:          Relaxation Techniques:     Find the pelvic floor muscles. Learn to make them contract and relax. First, look at the diagram of the pelvic floor muscles. The muscles sit like a hammock inside your pelvis. In the front, they start at the pubic bone, surround the vagina or penis and rectum, and attach in the back to your coccyx (tailbone). Picture the muscles in your mind. Imagine the muscles relaxing, radha, and relaxing. Imagine how they would feel without pain or problems. Picture the muscles sagging down now, with your vagina or rectum (as a [place]) getting larger, and your sitting bones (the bones in your pelvis) .      Learn diaphragmatic breathing. This is a type of “deep breathing” to practice during all your pelvic relaxation exercises. First, take a slow, gentle breath in through your nose, and allow your belly and ribs to flare out to the sides. “Open” your pelvic floor with your inhale breath. Exhale slowly and gently through your mouth, allowing your belly to fall. Let the air out of your upper lungs, relax your ribs, belly and pelvic floor.     Try yoga!  Please visit my website above for an excellent youTube video on specific yoga exercises for pelvic floor relaxation.  Even if you have tried yoga before, this video describes easy/beginner yoga poses that specifically improve pelvic floor relaxation.     Incorporate mindfulness practices such as meditation.  Sit in a quiet room in a comfortable position.  Try a guided meditation on youtube, or the apps Calm or Headspace.  I recommend “body scan meditations” as particularly effective in aiding pelvic floor relaxation.      Relax your pelvic floor prior to urinating.  Once you have learned to target these muscles and relax them, make a habit of relaxing them before you urinate.  Being intentional to relax these muscles will allow you to urinate more comfortably.     Pay attention to  other areas of your body for signs of tension. Tension in other parts of your body may be making the tension in the pelvic floor muscles worse. Check various body parts (head, neck, shoulders, eyes, cheeks, jaw, arms, hands, legs, feet, ribs, belly, and buttocks) for tension. Now, gently release any area that feels tense. Allow yourself to entirely relax.       I hope you find this handout helpful!    Please consider taking a brief survey to help me learn how helpful these materials have been for you.               Reed Mckinney MD,PhD   [facility]   [phone]

## 2024-04-24 NOTE — PROGRESS NOTES
Referring Physician: Dara Muñoz MD  A copy of this note was sent to the referring physician.       Diagnoses and all orders for this visit:    Testicular pain  -     Ambulatory Referral to Urology    Difficulty urinating  -     Ambulatory Referral to Urology            Assessment and plan:       1.  Mild lower urinary tract symptoms including incomplete bladder emptying and post micturition dribbling    2.  Occasional dysorgasmia    Patient's external physical exam today is unremarkable.  Prior CAT scan in January 2024 also demonstrates no abdominopelvic pathology    I suspect pelvic floor hypertonicity as the etiology of his symptoms.  I provided the patient with a copy of my personalize handout on pelvic floor relaxation exercises including a link to my website for the above.  We discussed some of these practices today    He will follow-up on an as-needed basis if his symptoms worsen over time    Reed Mckinney MD      Chief Complaint     Pelvic pain, lower urinary tract symptoms      History of Present Illness     Mitesh Ulloa is a 34 y.o. for an consultation for intermittent pelvic pain and lower urinary tract symptoms    Detailed Urologic History     - please refer to HPI    Review of Systems     Review of Systems   Constitutional: Negative for activity change and fatigue.   HENT: Negative for congestion.    Eyes: Negative for visual disturbance.   Respiratory: Negative for shortness of breath and wheezing.    Cardiovascular: Negative for chest pain and leg swelling.   Gastrointestinal: Negative for abdominal pain.   Endocrine: Negative for polyuria.   Genitourinary: Negative for dysuria, flank pain, hematuria and urgency.   Musculoskeletal: Negative for back pain.   Allergic/Immunologic: Negative for immunocompromised state.   Neurological: Negative for dizziness and numbness.   Psychiatric/Behavioral: Negative for dysphoric mood.   All other systems reviewed and are negative.          Allergies  "    Allergies   Allergen Reactions    Cephalosporins Hives     Includes cefaclor    Penicillins Hives     Includes Augmentin    Azithromycin Rash     Other Reaction(s): stomach problems       Physical Exam       Physical Exam  Constitutional:       General: He is not in acute distress.     Appearance: He is well-developed.   HENT:      Head: Normocephalic and atraumatic.   Cardiovascular:      Comments: Negative lower extremity edema  Pulmonary:      Effort: Pulmonary effort is normal.      Breath sounds: Normal breath sounds.   Abdominal:      Palpations: Abdomen is soft.   Musculoskeletal:         General: Normal range of motion.      Cervical back: Normal range of motion.   Skin:     General: Skin is warm.   Neurological:      Mental Status: He is alert and oriented to person, place, and time.   Psychiatric:         Behavior: Behavior normal.           Vital Signs  Vitals:    04/24/24 1551   BP: 116/68   BP Location: Left arm   Patient Position: Sitting   Cuff Size: Adult   Pulse: 67   SpO2: 99%   Weight: 75.1 kg (165 lb 9.6 oz)   Height: 5' 7\" (1.702 m)         Current Medications       Current Outpatient Medications:     docusate sodium (COLACE) 100 mg capsule, Take 1 capsule (100 mg total) by mouth every 12 (twelve) hours (Patient not taking: Reported on 4/24/2024), Disp: 60 capsule, Rfl: 0    tamsulosin (FLOMAX) 0.4 mg, Take 1 capsule (0.4 mg total) by mouth daily with dinner (Patient not taking: Reported on 7/27/2023), Disp: 30 capsule, Rfl: 5      Active Problems     Patient Active Problem List   Diagnosis    Psoriasis    Lipoma of left upper extremity    Feeling of incomplete bladder emptying         Past Medical History     Past Medical History:   Diagnosis Date    Addiction to drug (HCC)     Hx of migraine headaches     Psoriasis          Surgical History     Past Surgical History:   Procedure Laterality Date    TYMPANOSTOMY TUBE PLACEMENT      age 4         Family History     Family History   Problem " "Relation Age of Onset    No Known Problems Mother     Hypertension Father     Other Father         Colitiis    Hodgkin's lymphoma Maternal Grandmother 65    Lymphoma Maternal Grandfather 65        non-hodgkin's         Social History     Social History     Social History     Tobacco Use   Smoking Status Former    Current packs/day: 0.00    Average packs/day: 0.5 packs/day for 11.0 years (5.5 ttl pk-yrs)    Types: Cigarettes    Start date:     Quit date: 2018    Years since quittin.3   Smokeless Tobacco Never         Pertinent Lab Values     Lab Results   Component Value Date    CREATININE 1.12 04/10/2024       No results found for: \"PSA\"    @RESULTRCNT(1H])@      Pertinent Imaging       FINDINGS:     ABDOMEN     LOWER CHEST:  No clinically significant abnormality identified in the visualized lower chest.     LIVER/BILIARY TREE:  Unremarkable.     GALLBLADDER:  No calcified gallstones. No pericholecystic inflammatory change.     SPLEEN:  Unremarkable.     PANCREAS:  Unremarkable.     ADRENAL GLANDS:  Unremarkable.     KIDNEYS/URETERS:  Unremarkable. No hydronephrosis.     STOMACH AND BOWEL:  Unremarkable.     APPENDIX:  A normal appendix was visualized.     ABDOMINOPELVIC CAVITY:  No ascites.  No pneumoperitoneum.  No lymphadenopathy.     VESSELS:  Unremarkable for patient's age.     PELVIS     REPRODUCTIVE ORGANS:  Unremarkable for patient's age.     URINARY BLADDER:  Unremarkable.     ABDOMINAL WALL/INGUINAL REGIONS:  Unremarkable.     OSSEOUS STRUCTURES:  No acute fracture or destructive osseous lesion.     IMPRESSION:     No acute intra-abdominal abnormality. No free air or free fluid.        Portions of the record may have been created with voice recognition software.  Occasional wrong word or \"sound a like\" substitutions may have occurred due to the inherent limitations of voice recognition software.  In addition some of the content generated from this outpatient encounter includes information designed " for patient education and/or communication back to the referring provider.  Read the chart carefully and recognize, using context, where substitutions have occurred.

## 2024-06-18 ENCOUNTER — CONSULT (OUTPATIENT)
Dept: GASTROENTEROLOGY | Facility: AMBULARY SURGERY CENTER | Age: 34
End: 2024-06-18
Payer: COMMERCIAL

## 2024-06-18 ENCOUNTER — TELEPHONE (OUTPATIENT)
Age: 34
End: 2024-06-18

## 2024-06-18 ENCOUNTER — PREP FOR PROCEDURE (OUTPATIENT)
Dept: GASTROENTEROLOGY | Facility: AMBULARY SURGERY CENTER | Age: 34
End: 2024-06-18

## 2024-06-18 VITALS
SYSTOLIC BLOOD PRESSURE: 124 MMHG | WEIGHT: 166 LBS | OXYGEN SATURATION: 98 % | HEART RATE: 57 BPM | HEIGHT: 67 IN | BODY MASS INDEX: 26.06 KG/M2 | DIASTOLIC BLOOD PRESSURE: 74 MMHG

## 2024-06-18 DIAGNOSIS — Z83.79 FAMILY HISTORY OF ULCERATIVE COLITIS: Primary | ICD-10-CM

## 2024-06-18 DIAGNOSIS — R19.7 DIARRHEA, UNSPECIFIED TYPE: ICD-10-CM

## 2024-06-18 DIAGNOSIS — K92.1 HEMATOCHEZIA: ICD-10-CM

## 2024-06-18 DIAGNOSIS — R10.30 LOWER ABDOMINAL PAIN: ICD-10-CM

## 2024-06-18 PROCEDURE — 99244 OFF/OP CNSLTJ NEW/EST MOD 40: CPT | Performed by: INTERNAL MEDICINE

## 2024-06-18 RX ORDER — SODIUM, POTASSIUM,MAG SULFATES 17.5-3.13G
177 SOLUTION, RECONSTITUTED, ORAL ORAL ONCE
Qty: 177 ML | Refills: 0 | Status: SHIPPED | OUTPATIENT
Start: 2024-06-18 | End: 2024-06-18

## 2024-06-18 RX ORDER — POLYETHYLENE GLYCOL 3350 17 G/17G
POWDER, FOR SOLUTION ORAL
Qty: 238 G | Refills: 0 | Status: SHIPPED | OUTPATIENT
Start: 2024-06-18

## 2024-06-18 NOTE — PROGRESS NOTES
[facility] - Outpatient Consultation  Mitesh Ulloa 34 y.o. male MRN: [id number]  Encounter: [id number]      PCP: Dara Muñoz MD  Referring: Michelle Bosch MD  [address]  [place] [address]      ASSESSMENT AND PLAN:      1. Family history of ulcerative colitis  2. Hematochezia  3. Lower abdominal pain  4. Diarrhea, unspecified type  Chronic symptoms > 12 months, patient presents for medical management  Intermittent hematochezia with associated lower abdominal pain and diarrhea  Family history of IBD  - history of tobacco use, quit 5 years ago  Recommend colonoscopy to evaluate for IBD, distal colonic lesion or anorectal disease  - recommend empiric psyllium fiber therapy with metamucil 1 tbsp daily  - encouraged healthy diet including avoidance of processed food, consider Mediterranean diet  - Ambulatory Referral to Gastroenterology  - Colonoscopy; Future  - polyethylene glycol (GLYCOLAX) 17 GM/SCOOP powder; At 5pm take 5mgx2 dulcolax. At 6pm mix 238 g miralax in 64oz gatorade. Drink 8oz glass every 5 mins until 32oz finished. Drink remaining as rec.  Dispense: 238 g; Refill: 0    ______________________________________________________________________    CC:  Chief Complaint   Patient presents with    Rectal Bleeding     Patient states he noticed bright red blood in stool (off and on) for 6 months.  States his father and grandfather have ulcerative colitis.       HPI:      Patient is a 34-year-old male with complaints of hematochezia.  He has a lipoma of the upper extremity, psoriasis.  For the last several years approximately 2 times a month he notes bright red blood per rectum.  This is associated with bowel movements.  He describes frequent bathroom use-with bowel movements occurring 2-7 times a day.  He does note most of these bowel movements occur after eating.  He also experiences severe lower abdominal pain that occurs approximately 2-3 times a month.  He  "has not noted association between his bowel habits, rectal bleeding or abdominal pain at this time.  He has not noted association with particular food groups or dietary indiscretions, relates a \"poor\" diet, with primarily processed foods.  He denies any nocturnal symptoms, unintentional weight loss.  He has a family history of IBD-father with ulcerative colitis.  He has never previously had a colonoscopy.    He does relate social history significant for tobacco use, vaping, marijuana use, intranasal cocaine use- was imprisoned for the above 5 years ago, and has since maintained sobriety. He reports he is up to date with hepatitis and HIV screening.     REVIEW OF SYSTEMS:    CONSTITUTIONAL: Denies any fever, chills, rigors, and weight loss.  HEENT: No earache or tinnitus. Denies hearing loss or visual disturbances.  CARDIOVASCULAR: No chest pain or palpitations.   RESPIRATORY: Denies any cough, hemoptysis, shortness of breath or dyspnea on exertion.  GASTROINTESTINAL: As noted in the History of Present Illness.   GENITOURINARY: No problems with urination. Denies any hematuria or dysuria.  NEUROLOGIC: No dizziness or vertigo, denies headaches.   MUSCULOSKELETAL: Denies any muscle or joint pain.   SKIN: Denies skin rashes or itching.   ENDOCRINE: Denies excessive thirst. Denies intolerance to heat or cold.  PSYCHOSOCIAL: Denies depression or anxiety. Denies any recent memory loss.       Historical Information   Past Medical History:   Diagnosis Date    Addiction to drug (HCC)     Hx of migraine headaches     Psoriasis      Past Surgical History:   Procedure Laterality Date    TYMPANOSTOMY TUBE PLACEMENT      age 4     Social History   Social History     Substance and Sexual Activity   Alcohol Use No     Social History     Substance and Sexual Activity   Drug Use Not Currently     Social History     Tobacco Use   Smoking Status Former    Current packs/day: 0.00    Average packs/day: 0.5 packs/day for 11.0 years (5.5 ttl " "pk-yrs)    Types: Cigarettes    Start date:     Quit date: 2018    Years since quittin.4   Smokeless Tobacco Never     Family History   Problem Relation Age of Onset    No Known Problems Mother     Hypertension Father     Other Father         Colitiis    Hodgkin's lymphoma Maternal Grandmother 65    Lymphoma Maternal Grandfather 65        non-hodgkin's       Meds/Allergies       Current Outpatient Medications:     docusate sodium (COLACE) 100 mg capsule    tamsulosin (FLOMAX) 0.4 mg    Allergies   Allergen Reactions    Cephalosporins Hives     Includes cefaclor    Penicillins Hives     Includes Augmentin    Azithromycin Rash     Other Reaction(s): stomach problems           Objective     Blood pressure 124/74, pulse 57, height 5' 7\" (1.702 m), weight 75.3 kg (166 lb), SpO2 98%. Body mass index is 26 kg/m².     PHYSICAL EXAM:      General Appearance:   Alert, cooperative, no distress   HEENT:   Normocephalic, atraumatic, anicteric.     Neck:  Supple, symmetrical, trachea midline   Lungs:   Clear to auscultation bilaterally; no rales, rhonchi or wheezing; respirations unlabored    Heart::   Regular rate and rhythm; no murmur, rub, or gallop.   Abdomen:   Soft, non-tender, non-distended; normal bowel sounds; no masses, no organomegaly    Genitalia:   Deferred    Rectal:   Deferred    Extremities:  No cyanosis, clubbing or edema    Pulses:  2+ and symmetric    Skin:  No jaundice, rashes, or lesions    Lymph nodes:  No palpable cervical lymphadenopathy        Lab Results:     Lab Results   Component Value Date    WBC 6.15 2024    HGB 15.6 2024    HCT 46.3 2024    MCV 96 2024     2024       Lab Results   Component Value Date    K 4.4 04/10/2024     04/10/2024    CO2 30 04/10/2024    BUN 19 04/10/2024    CREATININE 1.12 04/10/2024    CALCIUM 9.9 04/10/2024    AST 20 04/10/2024    ALT 16 04/10/2024    ALKPHOS 68 04/10/2024    EGFR 88 04/10/2024       No results found for: " "\"INR\", \"PROTIME\"    I personally reviewed relevant labs    Radiology Results:       Portions of the record may have been created with voice recognition software. Occasional wrong word or \"sound a like\" substitutions may have occurred due to the inherent limitations of voice recognition software. Read the chart carefully and recognize, using context, where substitutions have occurred.        "

## 2024-06-18 NOTE — PATIENT INSTRUCTIONS
For your bowel habits, as we discussed during today's visit,  - I would recommend starting psyllium, fiber supplementation.  This can be done with use of Konsyl, Citrucil, Metamucil or Benefiber fiber, which can be purchased over-the-counter.  I would recommend starting slow with 1 tsp mixed into a large glass of water, once a day, and increasing to goal of 1 tbsp mixed into a large glass of water per day.  - this helps with both diarrhea and constipation, helping to bulk the stool, and should not cause constipation or diarrhea, but treat both  - the only side effect of this can be bloating, if this occurs, cut down on the dose, and increase your water intake or alternatively, consider switching to an alternative as listed above  Scheduled date of colonoscopy (as of today):  Sept 30, 2024  Physician performing colonoscopy: Dr. Castorena  Location of colonoscopy: [facility]  Bowel prep reviewed with patient: aaron/dulc  Instructions reviewed with patient by: Yen TYSON   Clearances: n/a

## 2024-06-19 ENCOUNTER — TELEPHONE (OUTPATIENT)
Age: 34
End: 2024-06-19

## 2024-06-19 NOTE — TELEPHONE ENCOUNTER
PA for Golytely    Submitted via    []CMM-KEY   [x]Summer-Case ID #   Case ID: [id number]   Payer: [facility]   Phone: [phone]   Fax: [phone]     []Faxed to plan   []Other website   []Phone call Case ID #     Office notes sent, clinical questions answered. Awaiting determination    Turnaround time for your insurance to make a decision on your Prior Authorization can take 7-21 business days.

## 2024-06-20 NOTE — TELEPHONE ENCOUNTER
PA for Golytely  Denied    Reason:(Screenshot if applicable)  Denied    Note from payer: Details of this decision are provided on the physician outcome notice which has been faxed to the number on file.  Payer: [employer] Case ID: [id number]    [phone]  Electronic appeal: Not supported  View History  Medication Being Authorized    polyethylene glycol (GLYCOLAX) 17 GM/SCOOP powder  At 5pm take 5mgx2 dulcolax. At 6pm mix 238 g miralax in 64oz gatorade. Drink 8oz glass every 5 mins until 32oz finished. Drink remaining as rec.  Dispense: 238 g Refills: 0   Start: 6/18/2024   Class: Normal Diagnoses: Hematochezia; Family history of ulcerative colitis; Lower abdominal pain; Diarrhea, unspecified type   This order has been released to its destination.      Message sent to office clinical pool Yes    Denial letter scanned into Media No letter recvd at time of denial    Appeal started No ( Provider will need to decide if appeal is warranted and send clinical documentation to PA team for initiation.)    **Please follow up with your patient regarding denial and next steps**

## 2024-09-16 ENCOUNTER — ANESTHESIA EVENT (OUTPATIENT)
Dept: ANESTHESIOLOGY | Facility: HOSPITAL | Age: 34
End: 2024-09-16

## 2024-09-16 ENCOUNTER — ANESTHESIA (OUTPATIENT)
Dept: ANESTHESIOLOGY | Facility: HOSPITAL | Age: 34
End: 2024-09-16

## 2024-09-25 ENCOUNTER — TELEPHONE (OUTPATIENT)
Dept: GASTROENTEROLOGY | Facility: AMBULARY SURGERY CENTER | Age: 34
End: 2024-09-25

## 2024-09-27 ENCOUNTER — TELEPHONE (OUTPATIENT)
Age: 34
End: 2024-09-27

## 2024-09-27 NOTE — TELEPHONE ENCOUNTER
"PT called and stated he went to [facility] yesterday and did a UA and UC waiting on UC results. Pt was told by provider bacteria is present in urine and given 1 abx but might have it changed depending on UC. PT stated he is frustrated due to he has been seen before and keeps getting brushed off due to his age. Pt is suggesting that maybe his prostate should be checked due to have difficulty urinating. Pt stated he also when urinating it is stop and go once he starts to urinate. Pt also stated he does not feel he is emptying fully and need to use the bathroom soon after he \"finishes\"  Pt stated also to maybe repeat imaging due it has been quiet some time since last imaging in 1/2024    Pt call [phone]  "

## 2024-12-16 ENCOUNTER — ANESTHESIA (OUTPATIENT)
Dept: ANESTHESIOLOGY | Facility: HOSPITAL | Age: 34
End: 2024-12-16

## 2024-12-16 ENCOUNTER — ANESTHESIA EVENT (OUTPATIENT)
Dept: ANESTHESIOLOGY | Facility: HOSPITAL | Age: 34
End: 2024-12-16

## 2025-03-05 ENCOUNTER — TELEPHONE (OUTPATIENT)
Dept: GASTROENTEROLOGY | Facility: AMBULARY SURGERY CENTER | Age: 35
End: 2025-03-05

## 2025-04-14 ENCOUNTER — TELEPHONE (OUTPATIENT)
Dept: GASTROENTEROLOGY | Facility: AMBULARY SURGERY CENTER | Age: 35
End: 2025-04-14

## 2025-04-14 NOTE — TELEPHONE ENCOUNTER
Spoke to patient in regards to rescheduling colonoscopy. He requested a return call in June to schedule. OV for May 2025 canceled.

## 2025-06-26 ENCOUNTER — TELEPHONE (OUTPATIENT)
Dept: GASTROENTEROLOGY | Facility: AMBULARY SURGERY CENTER | Age: 35
End: 2025-06-26

## 2025-06-26 NOTE — TELEPHONE ENCOUNTER
----- Message from Yen TYSON sent at 3/5/2025  2:25 PM EST -----  Regarding: reschedule colon  Reschedule colonoscopy w/ Dr. Castorena.